# Patient Record
Sex: MALE | Race: WHITE
[De-identification: names, ages, dates, MRNs, and addresses within clinical notes are randomized per-mention and may not be internally consistent; named-entity substitution may affect disease eponyms.]

---

## 2020-10-31 ENCOUNTER — HOSPITAL ENCOUNTER (OUTPATIENT)
Dept: HOSPITAL 8 - ED | Age: 60
Setting detail: OBSERVATION
LOS: 1 days | Discharge: HOME | End: 2020-11-01
Attending: STUDENT IN AN ORGANIZED HEALTH CARE EDUCATION/TRAINING PROGRAM | Admitting: STUDENT IN AN ORGANIZED HEALTH CARE EDUCATION/TRAINING PROGRAM
Payer: COMMERCIAL

## 2020-10-31 VITALS — DIASTOLIC BLOOD PRESSURE: 83 MMHG | SYSTOLIC BLOOD PRESSURE: 151 MMHG

## 2020-10-31 VITALS — BODY MASS INDEX: 29.6 KG/M2 | WEIGHT: 206.79 LBS | HEIGHT: 70 IN

## 2020-10-31 VITALS — SYSTOLIC BLOOD PRESSURE: 136 MMHG | DIASTOLIC BLOOD PRESSURE: 88 MMHG

## 2020-10-31 DIAGNOSIS — I71.2: ICD-10-CM

## 2020-10-31 DIAGNOSIS — I71.4: ICD-10-CM

## 2020-10-31 DIAGNOSIS — D72.829: ICD-10-CM

## 2020-10-31 DIAGNOSIS — J94.8: ICD-10-CM

## 2020-10-31 DIAGNOSIS — Z79.82: ICD-10-CM

## 2020-10-31 DIAGNOSIS — I51.89: ICD-10-CM

## 2020-10-31 DIAGNOSIS — I25.2: ICD-10-CM

## 2020-10-31 DIAGNOSIS — R07.89: Primary | ICD-10-CM

## 2020-10-31 DIAGNOSIS — Z86.79: ICD-10-CM

## 2020-10-31 DIAGNOSIS — I10: ICD-10-CM

## 2020-10-31 DIAGNOSIS — F17.290: ICD-10-CM

## 2020-10-31 DIAGNOSIS — Z79.899: ICD-10-CM

## 2020-10-31 DIAGNOSIS — Z95.5: ICD-10-CM

## 2020-10-31 DIAGNOSIS — I25.10: ICD-10-CM

## 2020-10-31 LAB
ALBUMIN SERPL-MCNC: 3.9 G/DL (ref 3.4–5)
ALP SERPL-CCNC: 81 U/L (ref 45–117)
ALT SERPL-CCNC: 46 U/L (ref 12–78)
ANION GAP SERPL CALC-SCNC: 4 MMOL/L (ref 5–15)
BASOPHILS # BLD AUTO: 0 X10^3/UL (ref 0–0.1)
BASOPHILS NFR BLD AUTO: 0 % (ref 0–1)
BILIRUB SERPL-MCNC: 1 MG/DL (ref 0.2–1)
CALCIUM SERPL-MCNC: 9 MG/DL (ref 8.5–10.1)
CHLORIDE SERPL-SCNC: 110 MMOL/L (ref 98–107)
CREAT SERPL-MCNC: 1.17 MG/DL (ref 0.7–1.3)
EOSINOPHIL # BLD AUTO: 0 X10^3/UL (ref 0–0.4)
EOSINOPHIL NFR BLD AUTO: 0 % (ref 1–7)
ERYTHROCYTE [DISTWIDTH] IN BLOOD BY AUTOMATED COUNT: 14.5 % (ref 9.4–14.8)
LYMPHOCYTES # BLD AUTO: 8.4 X10^3/UL (ref 1–3.4)
LYMPHOCYTES NFR BLD AUTO: 54 % (ref 22–44)
MCH RBC QN AUTO: 30.6 PG (ref 27.5–34.5)
MCHC RBC AUTO-ENTMCNC: 33.1 G/DL (ref 33.2–36.2)
MD: (no result)
MONOCYTES # BLD AUTO: 0.5 X10^3/UL (ref 0.2–0.8)
MONOCYTES NFR BLD AUTO: 3 % (ref 2–9)
NEUTROPHILS # BLD AUTO: 6.6 X10^3/UL (ref 1.8–6.8)
NEUTROPHILS NFR BLD AUTO: 42 % (ref 42–75)
PLATELET # BLD AUTO: 195 X10^3/UL (ref 130–400)
PMV BLD AUTO: 7.6 FL (ref 7.4–10.4)
PROT SERPL-MCNC: 7.3 G/DL (ref 6.4–8.2)
RBC # BLD AUTO: 4.54 X10^6/UL (ref 4.38–5.82)
TROPONIN I SERPL-MCNC: < 0.015 NG/ML (ref 0–0.04)

## 2020-10-31 PROCEDURE — 71275 CT ANGIOGRAPHY CHEST: CPT

## 2020-10-31 PROCEDURE — 93005 ELECTROCARDIOGRAM TRACING: CPT

## 2020-10-31 PROCEDURE — 71045 X-RAY EXAM CHEST 1 VIEW: CPT

## 2020-10-31 PROCEDURE — 99285 EMERGENCY DEPT VISIT HI MDM: CPT

## 2020-10-31 PROCEDURE — G0378 HOSPITAL OBSERVATION PER HR: HCPCS

## 2020-10-31 PROCEDURE — 36415 COLL VENOUS BLD VENIPUNCTURE: CPT

## 2020-10-31 PROCEDURE — 84484 ASSAY OF TROPONIN QUANT: CPT

## 2020-10-31 PROCEDURE — 83880 ASSAY OF NATRIURETIC PEPTIDE: CPT

## 2020-10-31 PROCEDURE — 93306 TTE W/DOPPLER COMPLETE: CPT

## 2020-10-31 PROCEDURE — 85025 COMPLETE CBC W/AUTO DIFF WBC: CPT

## 2020-10-31 PROCEDURE — 96372 THER/PROPH/DIAG INJ SC/IM: CPT

## 2020-10-31 PROCEDURE — 80053 COMPREHEN METABOLIC PANEL: CPT

## 2020-10-31 PROCEDURE — 85379 FIBRIN DEGRADATION QUANT: CPT

## 2020-10-31 RX ADMIN — HEPARIN SODIUM SCH UNITS: 5000 INJECTION, SOLUTION INTRAVENOUS; SUBCUTANEOUS at 16:05

## 2020-10-31 NOTE — NUR
NO CHANGES.  PT. AND HIS WIFE WERE GIVEN WATER. Patient with one or more new problems requiring additional work-up/treatment.

## 2020-10-31 NOTE — NUR
PT. REMAINS MONITORED.  PT. VOIDED 400 CC URINE.  NO CONCERNS AT THIS TIME.  
HOSPITALIST AT THE BEDSIDE TO TALK WITH THE PT.  CALL LIGHT REMAINS IN REACH.

## 2020-10-31 NOTE — NUR
RECEIVED BESIDE REPORT AND CARE WAS ASSUMED.  IV ACCESS ESTABLISHED.  PT. 
REMAINS MONITORED AND IS IN A SINUS RHYTHM WITHOUT ECTOPY.  PT. IS A & O X 4 
WITH A GCS OF 15.  PT. STATES CHEST PAIN IS 2/10.  PT. WAS MEDICATED WITH NITRO 
AND ASPIRIN AS ORDERED.  PT.'S CAP REFILL IS BRISK, LESS THAN 3 SECONDS.  
PULSES ARE +2 THROUGHOUT WITHOUT EDEMA NOTED IN HIS EXTREMITIES.  PT. IS ABLE 
TO SCHAFER WNL.  PT.'S LUNGS ARE CTA THROUGHOUT.  PT.'S ABD. IS SOFT AND ROUND WITH 
BS + X 4 QUADS.  PT. IS RESTING WITH THE HOB ELEVATED GREATER THAN 30 DEGREES 
AND BLANKETS ARE IN PLACE FOR WARMTH.  SIDERAILS REMAIN UP X 2 WITH THE CALL 
LIGHT IN REACH.  PT. HAS NO CONCERNS AT THIS TIME.  DR. CHAVEZ TO THE BEDSIDE 
TO DISCUSS THE PLAN OF CARE AND LAB FINDINGS OF A POSITIVE D-DIMER.  PT. IS 
INFORMED THAT A CT SCAN OF HIS CHEST HAS BEEN ORDERED TO EVALUATE FOR A 
POSSIBLE PE.  PT. HAS NO QUESTIONS AT THIS TIME.

## 2020-10-31 NOTE — NUR
PT FROM [place], ARRIVED IN [place] WEDNESDAY, STATES THAT WHEN HE LAYED DOWN LAST 
NIGHT HE BEGAN HAVING SOB AND HAS BEEN UNABLE TO LAY FLAT.  STATES THAT HE ALSO 
HAD CHEST HEAVINESS AND SOB, STATES THAT HE HAS A HISTORY OF MI AND THAT THIS 
FEELS THE SAME, 12 LEAD OBTAINED UPON ARRIVAL, NO STEMI REVEALED, SR.  STATES 
THAT HE TAKES PRADAXA AND A STATIN.

## 2020-11-01 VITALS — DIASTOLIC BLOOD PRESSURE: 81 MMHG | SYSTOLIC BLOOD PRESSURE: 136 MMHG

## 2020-11-01 VITALS — DIASTOLIC BLOOD PRESSURE: 75 MMHG | SYSTOLIC BLOOD PRESSURE: 122 MMHG

## 2020-11-01 LAB
ALBUMIN SERPL-MCNC: 3.5 G/DL (ref 3.4–5)
ALP SERPL-CCNC: 73 U/L (ref 45–117)
ALT SERPL-CCNC: 45 U/L (ref 12–78)
ANION GAP SERPL CALC-SCNC: 5 MMOL/L (ref 5–15)
BASOPHILS # BLD AUTO: 0 X10^3/UL (ref 0–0.1)
BASOPHILS NFR BLD AUTO: 0 % (ref 0–1)
BILIRUB SERPL-MCNC: 1.3 MG/DL (ref 0.2–1)
CALCIUM SERPL-MCNC: 8.4 MG/DL (ref 8.5–10.1)
CHLORIDE SERPL-SCNC: 109 MMOL/L (ref 98–107)
CREAT SERPL-MCNC: 1.1 MG/DL (ref 0.7–1.3)
EOSINOPHIL # BLD AUTO: 0.1 X10^3/UL (ref 0–0.4)
EOSINOPHIL NFR BLD AUTO: 1 % (ref 1–7)
ERYTHROCYTE [DISTWIDTH] IN BLOOD BY AUTOMATED COUNT: 14.5 % (ref 9.4–14.8)
LYMPHOCYTES # BLD AUTO: 6.8 X10^3/UL (ref 1–3.4)
LYMPHOCYTES NFR BLD AUTO: 55 % (ref 22–44)
MCH RBC QN AUTO: 31 PG (ref 27.5–34.5)
MCHC RBC AUTO-ENTMCNC: 33.8 G/DL (ref 33.2–36.2)
MD: (no result)
MONOCYTES # BLD AUTO: 0.5 X10^3/UL (ref 0.2–0.8)
MONOCYTES NFR BLD AUTO: 4 % (ref 2–9)
NEUTROPHILS # BLD AUTO: 4.9 X10^3/UL (ref 1.8–6.8)
NEUTROPHILS NFR BLD AUTO: 40 % (ref 42–75)
PLATELET # BLD AUTO: 176 X10^3/UL (ref 130–400)
PMV BLD AUTO: 8 FL (ref 7.4–10.4)
PROT SERPL-MCNC: 6.4 G/DL (ref 6.4–8.2)
RBC # BLD AUTO: 4.18 X10^6/UL (ref 4.38–5.82)
TROPONIN I SERPL-MCNC: < 0.015 NG/ML (ref 0–0.04)

## 2020-11-01 RX ADMIN — HEPARIN SODIUM SCH UNITS: 5000 INJECTION, SOLUTION INTRAVENOUS; SUBCUTANEOUS at 08:00

## 2020-11-01 RX ADMIN — HEPARIN SODIUM SCH UNITS: 5000 INJECTION, SOLUTION INTRAVENOUS; SUBCUTANEOUS at 01:25
